# Patient Record
Sex: FEMALE | Race: WHITE | NOT HISPANIC OR LATINO | Employment: FULL TIME | URBAN - METROPOLITAN AREA
[De-identification: names, ages, dates, MRNs, and addresses within clinical notes are randomized per-mention and may not be internally consistent; named-entity substitution may affect disease eponyms.]

---

## 2024-01-02 DIAGNOSIS — L70.8 OTHER ACNE: Primary | ICD-10-CM

## 2024-01-02 DIAGNOSIS — L73.9 FOLLICULAR DISORDER, UNSPECIFIED: ICD-10-CM

## 2024-01-02 RX ORDER — DOXYCYCLINE 100 MG/1
100 CAPSULE ORAL DAILY
COMMUNITY
Start: 2023-05-19 | End: 2024-01-02 | Stop reason: SDUPTHER

## 2024-01-03 RX ORDER — DOXYCYCLINE 100 MG/1
100 CAPSULE ORAL DAILY
Qty: 90 CAPSULE | Refills: 0 | Status: SHIPPED | OUTPATIENT
Start: 2024-01-03 | End: 2024-04-15 | Stop reason: WASHOUT

## 2024-01-05 RX ORDER — ATORVASTATIN CALCIUM 20 MG/1
20 TABLET, FILM COATED ORAL NIGHTLY
COMMUNITY

## 2024-01-05 RX ORDER — ATORVASTATIN CALCIUM 20 MG/1
20 TABLET, FILM COATED ORAL NIGHTLY
Qty: 90 TABLET | Refills: 0 | OUTPATIENT
Start: 2024-01-05

## 2024-01-06 RX ORDER — DOXYCYCLINE 100 MG/1
CAPSULE ORAL
Qty: 180 CAPSULE | Refills: 11 | Status: SHIPPED | OUTPATIENT
Start: 2024-01-06

## 2024-03-13 ENCOUNTER — LAB (OUTPATIENT)
Dept: LAB | Facility: LAB | Age: 74
End: 2024-03-13
Payer: MEDICARE

## 2024-03-13 DIAGNOSIS — E03.9 HYPOTHYROIDISM, UNSPECIFIED: ICD-10-CM

## 2024-03-13 DIAGNOSIS — R73.02 IMPAIRED GLUCOSE TOLERANCE (ORAL): ICD-10-CM

## 2024-03-13 DIAGNOSIS — I10 ESSENTIAL (PRIMARY) HYPERTENSION: Primary | ICD-10-CM

## 2024-03-13 DIAGNOSIS — E78.5 HYPERLIPIDEMIA, UNSPECIFIED: ICD-10-CM

## 2024-03-13 DIAGNOSIS — E55.9 VITAMIN D DEFICIENCY, UNSPECIFIED: ICD-10-CM

## 2024-03-13 LAB
25(OH)D3 SERPL-MCNC: 63 NG/ML (ref 30–100)
ALBUMIN SERPL BCP-MCNC: 4.2 G/DL (ref 3.4–5)
ALP SERPL-CCNC: 50 U/L (ref 33–136)
ALT SERPL W P-5'-P-CCNC: 24 U/L (ref 7–45)
ANION GAP SERPL CALC-SCNC: 14 MMOL/L (ref 10–20)
APPEARANCE UR: ABNORMAL
AST SERPL W P-5'-P-CCNC: 31 U/L (ref 9–39)
BILIRUB SERPL-MCNC: 0.6 MG/DL (ref 0–1.2)
BILIRUB UR STRIP.AUTO-MCNC: NEGATIVE MG/DL
BUN SERPL-MCNC: 26 MG/DL (ref 6–23)
CALCIUM SERPL-MCNC: 9.6 MG/DL (ref 8.6–10.6)
CHLORIDE SERPL-SCNC: 103 MMOL/L (ref 98–107)
CO2 SERPL-SCNC: 27 MMOL/L (ref 21–32)
COLOR UR: YELLOW
CREAT SERPL-MCNC: 1.03 MG/DL (ref 0.5–1.05)
EGFRCR SERPLBLD CKD-EPI 2021: 58 ML/MIN/1.73M*2
ERYTHROCYTE [DISTWIDTH] IN BLOOD BY AUTOMATED COUNT: 14.5 % (ref 11.5–14.5)
EST. AVERAGE GLUCOSE BLD GHB EST-MCNC: 105 MG/DL
GLUCOSE SERPL-MCNC: 85 MG/DL (ref 74–99)
GLUCOSE UR STRIP.AUTO-MCNC: NORMAL MG/DL
HBA1C MFR BLD: 5.3 %
HCT VFR BLD AUTO: 39.5 % (ref 36–46)
HGB BLD-MCNC: 12.4 G/DL (ref 12–16)
KETONES UR STRIP.AUTO-MCNC: ABNORMAL MG/DL
LEUKOCYTE ESTERASE UR QL STRIP.AUTO: NEGATIVE
MCH RBC QN AUTO: 31.3 PG (ref 26–34)
MCHC RBC AUTO-ENTMCNC: 31.4 G/DL (ref 32–36)
MCV RBC AUTO: 100 FL (ref 80–100)
NITRITE UR QL STRIP.AUTO: NEGATIVE
NRBC BLD-RTO: 0 /100 WBCS (ref 0–0)
PH UR STRIP.AUTO: 5.5 [PH]
PLATELET # BLD AUTO: 267 X10*3/UL (ref 150–450)
POTASSIUM SERPL-SCNC: 4.4 MMOL/L (ref 3.5–5.3)
PROT SERPL-MCNC: 6.7 G/DL (ref 6.4–8.2)
PROT UR STRIP.AUTO-MCNC: ABNORMAL MG/DL
RBC # BLD AUTO: 3.96 X10*6/UL (ref 4–5.2)
RBC # UR STRIP.AUTO: NEGATIVE /UL
RBC #/AREA URNS AUTO: NORMAL /HPF
SODIUM SERPL-SCNC: 140 MMOL/L (ref 136–145)
SP GR UR STRIP.AUTO: 1.04
TSH SERPL-ACNC: 3.26 MIU/L (ref 0.44–3.98)
UROBILINOGEN UR STRIP.AUTO-MCNC: NORMAL MG/DL
WBC # BLD AUTO: 5 X10*3/UL (ref 4.4–11.3)
WBC #/AREA URNS AUTO: NORMAL /HPF

## 2024-03-13 PROCEDURE — 85027 COMPLETE CBC AUTOMATED: CPT

## 2024-03-13 PROCEDURE — 83036 HEMOGLOBIN GLYCOSYLATED A1C: CPT

## 2024-03-13 PROCEDURE — 80053 COMPREHEN METABOLIC PANEL: CPT

## 2024-03-13 PROCEDURE — 81001 URINALYSIS AUTO W/SCOPE: CPT

## 2024-03-13 PROCEDURE — 82306 VITAMIN D 25 HYDROXY: CPT

## 2024-03-13 PROCEDURE — 36415 COLL VENOUS BLD VENIPUNCTURE: CPT

## 2024-03-13 PROCEDURE — 84443 ASSAY THYROID STIM HORMONE: CPT

## 2024-03-15 ENCOUNTER — APPOINTMENT (OUTPATIENT)
Dept: DERMATOLOGY | Facility: CLINIC | Age: 74
End: 2024-03-15
Payer: MEDICARE

## 2024-03-18 ENCOUNTER — LAB (OUTPATIENT)
Dept: LAB | Facility: LAB | Age: 74
End: 2024-03-18
Payer: MEDICARE

## 2024-03-18 ENCOUNTER — HOSPITAL ENCOUNTER (OUTPATIENT)
Dept: RADIOLOGY | Facility: HOSPITAL | Age: 74
Discharge: HOME | End: 2024-03-18
Payer: MEDICARE

## 2024-03-18 VITALS — BODY MASS INDEX: 21.16 KG/M2 | HEIGHT: 65 IN | WEIGHT: 127 LBS

## 2024-03-18 DIAGNOSIS — R10.2 PELVIC AND PERINEAL PAIN: ICD-10-CM

## 2024-03-18 DIAGNOSIS — R10.11 RIGHT UPPER QUADRANT PAIN: ICD-10-CM

## 2024-03-18 DIAGNOSIS — E78.5 HYPERLIPIDEMIA, UNSPECIFIED: Primary | ICD-10-CM

## 2024-03-18 DIAGNOSIS — Z12.31 ENCOUNTER FOR SCREENING MAMMOGRAM FOR MALIGNANT NEOPLASM OF BREAST: ICD-10-CM

## 2024-03-18 LAB
CHOLEST SERPL-MCNC: 193 MG/DL (ref 0–199)
CHOLESTEROL/HDL RATIO: 2.6
HDLC SERPL-MCNC: 74.8 MG/DL
LDLC SERPL CALC-MCNC: 106 MG/DL
NON HDL CHOLESTEROL: 118 MG/DL (ref 0–149)
TRIGL SERPL-MCNC: 60 MG/DL (ref 0–149)
VLDL: 12 MG/DL (ref 0–40)

## 2024-03-18 PROCEDURE — 77067 SCR MAMMO BI INCL CAD: CPT

## 2024-03-18 PROCEDURE — 76830 TRANSVAGINAL US NON-OB: CPT

## 2024-03-18 PROCEDURE — 80061 LIPID PANEL: CPT

## 2024-03-18 PROCEDURE — 76705 ECHO EXAM OF ABDOMEN: CPT | Performed by: RADIOLOGY

## 2024-03-18 PROCEDURE — 76830 TRANSVAGINAL US NON-OB: CPT | Performed by: RADIOLOGY

## 2024-03-18 PROCEDURE — 76856 US EXAM PELVIC COMPLETE: CPT | Performed by: RADIOLOGY

## 2024-03-18 PROCEDURE — 77063 BREAST TOMOSYNTHESIS BI: CPT | Performed by: RADIOLOGY

## 2024-03-18 PROCEDURE — 36415 COLL VENOUS BLD VENIPUNCTURE: CPT

## 2024-03-18 PROCEDURE — 77067 SCR MAMMO BI INCL CAD: CPT | Performed by: RADIOLOGY

## 2024-03-18 PROCEDURE — 76705 ECHO EXAM OF ABDOMEN: CPT

## 2024-04-01 ENCOUNTER — TELEPHONE (OUTPATIENT)
Dept: PULMONOLOGY | Facility: CLINIC | Age: 74
End: 2024-04-01
Payer: MEDICARE

## 2024-04-01 NOTE — TELEPHONE ENCOUNTER
Lung Nodules were being monitored. Last saw 2 years ago. Patient has worsening cough. Do you want to order testing and then see patient, or see patient first?

## 2024-04-15 ENCOUNTER — OFFICE VISIT (OUTPATIENT)
Dept: PULMONOLOGY | Facility: CLINIC | Age: 74
End: 2024-04-15
Payer: MEDICARE

## 2024-04-15 VITALS
HEART RATE: 61 BPM | BODY MASS INDEX: 21.73 KG/M2 | DIASTOLIC BLOOD PRESSURE: 64 MMHG | SYSTOLIC BLOOD PRESSURE: 98 MMHG | WEIGHT: 130.6 LBS | RESPIRATION RATE: 16 BRPM | TEMPERATURE: 97.4 F | OXYGEN SATURATION: 98 %

## 2024-04-15 DIAGNOSIS — R05.3 CHRONIC COUGH: Primary | ICD-10-CM

## 2024-04-15 PROCEDURE — 1159F MED LIST DOCD IN RCRD: CPT | Performed by: INTERNAL MEDICINE

## 2024-04-15 PROCEDURE — 1036F TOBACCO NON-USER: CPT | Performed by: INTERNAL MEDICINE

## 2024-04-15 PROCEDURE — 1160F RVW MEDS BY RX/DR IN RCRD: CPT | Performed by: INTERNAL MEDICINE

## 2024-04-15 PROCEDURE — 99214 OFFICE O/P EST MOD 30 MIN: CPT | Performed by: INTERNAL MEDICINE

## 2024-04-15 ASSESSMENT — ENCOUNTER SYMPTOMS
UNEXPECTED WEIGHT CHANGE: 0
COUGH: 1
EYE DISCHARGE: 0
SINUS PRESSURE: 0
NERVOUS/ANXIOUS: 0
ARTHRALGIAS: 0
CONSTITUTIONAL NEGATIVE: 1
HEMATURIA: 0
SLEEP DISTURBANCE: 0
ABDOMINAL DISTENTION: 0
LIGHT-HEADEDNESS: 0
NUMBNESS: 0
ABDOMINAL PAIN: 0
NEUROLOGICAL NEGATIVE: 1
WHEEZING: 0
DYSURIA: 0
DIFFICULTY URINATING: 0
SHORTNESS OF BREATH: 0
FEVER: 0
JOINT SWELLING: 0
EYES NEGATIVE: 1
HEADACHES: 0
DIZZINESS: 0
FREQUENCY: 0
CARDIOVASCULAR NEGATIVE: 1
NAUSEA: 0
CHOKING: 0
FACIAL SWELLING: 0
MUSCULOSKELETAL NEGATIVE: 1
BRUISES/BLEEDS EASILY: 0
GASTROINTESTINAL NEGATIVE: 1
AGITATION: 0
WEAKNESS: 0
EYE REDNESS: 0
TREMORS: 0
SINUS PAIN: 0
ENDOCRINE NEGATIVE: 1
CONSTIPATION: 0
RHINORRHEA: 0
SPEECH DIFFICULTY: 0
HEMATOLOGIC/LYMPHATIC NEGATIVE: 1
PALPITATIONS: 0
ADENOPATHY: 0
APNEA: 0
PSYCHIATRIC NEGATIVE: 1
ALLERGIC/IMMUNOLOGIC NEGATIVE: 1
FATIGUE: 0
STRIDOR: 0

## 2024-04-15 NOTE — PROGRESS NOTES
@PULMONARY FOLLOW-UP@       PROBLEM:  cough     ASSESSMENT:  The patient is a 73-year-old with osteopenia, Raynaud's and a chronic cough for years.  Her methacholine challenge test was negative.  She reports a history of asthma growing up with some seasonal allergies.  She has no occupational exposures working as an .  She is a never smoker.  She did have some air trapping on the previous CT scan several years ago with some apical nodular scarring.  For completeness probably a follow-up HRCT scan of the chest is in order.    PLAN:  Would suggest obtaining a follow-up high-resolution CT scan of the chest by calling       HISTORY OF PRESENT ILLNESS:  The patient is a 73 -year-old   with a history of anxiety depression, osteopenia and Raynaud's phenomena. She also has a history of asthma and is on Advair and Singulair. In addition, she had a history of ovarian cancer, and glucose intolerance and some seasonal allergies. She smoked an occasional amount in the the past and several months before was treated with some prednisone. In addition, she's been followed for some pulmonary nodules which have been stable and she received proton pump inhibitor for GERD. The last CT scan was performed in May 2018 revealed multiple bilateral pulmonary nodules measuring up to 7 mm.     When seen on October 15, 2018 the patient reported that her cough has been present over the last year and a half or so. Perhaps it has worsened somewhat in the spring but there are no specific triggers. She did have asthma as a child and some hayfever which she outgrew. Also she had some cold weather induced asthma growing out. She had no postnasal drip or sinus congestion. Occasionally she had some eczema. Also time she felt that she may aspirate liquids. She [place] up [place] farm and was around various animals. Her father developed asthma as an older adult. She smoked a little bit in college and works as an      It should be  noted that she has been treated with some oral corticosteroids, antibiotics and Tessalon with limited improvement. In addition, she continued on her Advair and Singulair as noted. She found that rescue inhalers were not helpful.    Her PFTs were obtained and were normal and I considered ordering a methacholine challenge test and she needed follow-up CT scanning as well as evaluation for aspiration    When seen again in follow-up on 2020 is reported that she has had several CT scans the most recent from 2020. The latter demonstrated the followin. Hyperinflation with multifocal areas of nodularity and likely scarring. An area of pleural-based nodularity in the posterior aspect of the right upper lobe may represent more pronounced scarring or atelectasis and appears larger than the prior exam. Consider another follow-up CT of the chest in approximately 6 months to re-evaluate and assess stability.    Also her methacholine challenge test was negative and she did demonstrate some laryngeal penetration on modified barium swallow and was sent to ENT.     Also, when seen last it also was noted that voice and swallowing training any aspiration has helped. She is going through a difficult time having lost both parents in the last year. Currently, she is having no coughing congestion or wheezing. She generally feels decent. She was placed on Zoloft after her parents . She continues on it at the present time.    A follow-up CT scan from 2022 revealed the following  1.  Apical pleural thickening with nodularity is stable when compared  to a study of 2018. There is no evidence of interstitial lung  disease. There is mild basilar air trapping without peribronchial  thickening or bronchiectasis.      The patient's cough is getting worse.  This is particular over the last several months.  She may have had a cold when it started getting worse.  She has had a chronic cough that I saw  her several years ago for.  Evaluation as outlined above.  She has been using the swallowing techniques that may be the cause for her chronic coughing.  She has no fevers or chills.  She has no major postnasal drip.      Not on File         Current Outpatient Medications:     atorvastatin (Lipitor) 20 mg tablet, Take 1 tablet (20 mg) by mouth once daily at bedtime., Disp: , Rfl:     doxycycline (Vibramycin) 100 mg capsule, Take 1 capsule by mouth twice daily with food and a full glass of water., Disp: 180 capsule, Rfl: 11    pantoprazole (ProtoNix) 40 mg EC tablet, TAKE 1 TABLET BY MOUTH EVERY DAY IN THE MORNING, Disp: 90 tablet, Rfl: 3          Review of Systems   Constitutional: Negative.  Negative for fatigue, fever and unexpected weight change.   HENT: Negative.  Negative for congestion, facial swelling, nosebleeds, postnasal drip, rhinorrhea, sinus pressure and sinus pain.    Eyes: Negative.  Negative for discharge, redness and visual disturbance.   Respiratory:  Positive for cough. Negative for apnea, choking, shortness of breath, wheezing and stridor.    Cardiovascular: Negative.  Negative for chest pain, palpitations and leg swelling.   Gastrointestinal: Negative.  Negative for abdominal distention, abdominal pain, constipation and nausea.   Endocrine: Negative.  Negative for cold intolerance and heat intolerance.   Genitourinary: Negative.  Negative for difficulty urinating, dysuria, frequency and hematuria.   Musculoskeletal: Negative.  Negative for arthralgias, gait problem and joint swelling.   Skin: Negative.    Allergic/Immunologic: Negative.  Negative for environmental allergies, food allergies and immunocompromised state.   Neurological: Negative.  Negative for dizziness, tremors, syncope, speech difficulty, weakness, light-headedness, numbness and headaches.   Hematological: Negative.  Negative for adenopathy. Does not bruise/bleed easily.   Psychiatric/Behavioral: Negative.  Negative for agitation,  behavioral problems and sleep disturbance. The patient is not nervous/anxious.    All other systems reviewed and are negative.       Vitals:    04/15/24 1043   BP: 98/64   Pulse: 61   Resp: 16   Temp: 36.3 °C (97.4 °F)   SpO2: 98%        Physical Exam  Vitals reviewed.   Constitutional:       Appearance: Normal appearance.   HENT:      Head: Normocephalic and atraumatic.   Eyes:      Extraocular Movements: Extraocular movements intact.   Cardiovascular:      Rate and Rhythm: Normal rate and regular rhythm.      Heart sounds: No murmur heard.     No friction rub. No gallop.   Pulmonary:      Effort: Pulmonary effort is normal. No respiratory distress.      Breath sounds: Normal breath sounds. No stridor. No wheezing, rhonchi or rales.   Chest:      Chest wall: No tenderness.   Abdominal:      General: Abdomen is flat. There is no distension.      Palpations: Abdomen is soft. There is no mass.      Tenderness: There is no abdominal tenderness.   Musculoskeletal:         General: Normal range of motion.      Cervical back: Normal range of motion.      Right lower leg: No edema.      Left lower leg: No edema.   Skin:     General: Skin is warm and dry.   Neurological:      Mental Status: She is alert and oriented to person, place, and time.   Psychiatric:         Mood and Affect: Mood normal.         Behavior: Behavior normal.

## 2024-04-18 ENCOUNTER — HOSPITAL ENCOUNTER (OUTPATIENT)
Dept: RADIOLOGY | Facility: CLINIC | Age: 74
Discharge: HOME | End: 2024-04-18
Payer: MEDICARE

## 2024-04-18 ENCOUNTER — DOCUMENTATION (OUTPATIENT)
Dept: PULMONOLOGY | Facility: HOSPITAL | Age: 74
End: 2024-04-18
Payer: MEDICARE

## 2024-04-18 DIAGNOSIS — R05.3 CHRONIC COUGH: ICD-10-CM

## 2024-04-18 DIAGNOSIS — R05.3 CHRONIC COUGH: Primary | ICD-10-CM

## 2024-04-18 PROCEDURE — 71250 CT THORAX DX C-: CPT | Performed by: RADIOLOGY

## 2024-04-18 PROCEDURE — 71250 CT THORAX DX C-: CPT

## 2024-04-18 RX ORDER — PREDNISONE 10 MG/1
TABLET ORAL
Qty: 30 TABLET | Refills: 0 | Status: SHIPPED | OUTPATIENT
Start: 2024-04-18 | End: 2024-05-18

## 2024-04-18 NOTE — PROGRESS NOTES
Patient CT scan reveals no fibrosis or anything of concern.  She has some slight nodularity which is new probably mucous plugging.  Even though her methacholine challenge test was negative in the past I suspect it still is airway inflammation causing the coughing.  Will try a prednisone taper and she will keep me posted.  Will get a follow-up CT scan in 6 months.

## 2024-04-22 ENCOUNTER — APPOINTMENT (OUTPATIENT)
Dept: RADIOLOGY | Facility: CLINIC | Age: 74
End: 2024-04-22
Payer: MEDICARE

## 2024-05-23 ENCOUNTER — HOSPITAL ENCOUNTER (OUTPATIENT)
Dept: RADIOLOGY | Facility: CLINIC | Age: 74
Discharge: HOME | End: 2024-05-23
Payer: MEDICARE

## 2024-05-23 DIAGNOSIS — Z78.0 ASYMPTOMATIC MENOPAUSAL STATE: ICD-10-CM

## 2024-06-12 ENCOUNTER — DOCUMENTATION (OUTPATIENT)
Dept: PULMONOLOGY | Facility: HOSPITAL | Age: 74
End: 2024-06-12
Payer: MEDICARE

## 2024-06-12 ENCOUNTER — TELEPHONE (OUTPATIENT)
Dept: PULMONOLOGY | Facility: CLINIC | Age: 74
End: 2024-06-12
Payer: MEDICARE

## 2024-06-12 DIAGNOSIS — R05.3 CHRONIC COUGH: Primary | ICD-10-CM

## 2024-06-12 RX ORDER — ALBUTEROL SULFATE 90 UG/1
2 AEROSOL, METERED RESPIRATORY (INHALATION) EVERY 6 HOURS PRN
Qty: 18 G | Refills: 11 | Status: SHIPPED | OUTPATIENT
Start: 2024-06-12 | End: 2025-06-12

## 2024-06-12 RX ORDER — MOMETASONE FUROATE AND FORMOTEROL FUMARATE DIHYDRATE 100; 5 UG/1; UG/1
2 AEROSOL RESPIRATORY (INHALATION)
Qty: 13 G | Refills: 11 | Status: SHIPPED | OUTPATIENT
Start: 2024-06-12 | End: 2025-06-12

## 2024-06-12 NOTE — PROGRESS NOTES
Patient continues to cough despite the negative methacholine challenge test in the past.  However there there is suggestion this is inflammatory in the airways.  Will try some controller medication along with albuterol as needed.  She will keep me posted.

## 2024-06-18 ENCOUNTER — LAB (OUTPATIENT)
Dept: LAB | Facility: LAB | Age: 74
End: 2024-06-18
Payer: MEDICARE

## 2024-06-18 DIAGNOSIS — L72.8 OTHER FOLLICULAR CYSTS OF THE SKIN AND SUBCUTANEOUS TISSUE: ICD-10-CM

## 2024-06-18 DIAGNOSIS — L70.0 ACNE VULGARIS: Primary | ICD-10-CM

## 2024-07-29 ENCOUNTER — APPOINTMENT (OUTPATIENT)
Dept: ORTHOPEDIC SURGERY | Facility: CLINIC | Age: 74
End: 2024-07-29
Payer: MEDICARE

## 2024-07-29 ENCOUNTER — HOSPITAL ENCOUNTER (OUTPATIENT)
Dept: RADIOLOGY | Facility: CLINIC | Age: 74
Discharge: HOME | End: 2024-07-29
Payer: MEDICARE

## 2024-07-29 DIAGNOSIS — M19.031 OSTEOARTHRITIS OF RIGHT WRIST, UNSPECIFIED OSTEOARTHRITIS TYPE: Primary | ICD-10-CM

## 2024-07-29 DIAGNOSIS — M79.641 RIGHT HAND PAIN: ICD-10-CM

## 2024-07-29 PROCEDURE — 20606 DRAIN/INJ JOINT/BURSA W/US: CPT | Performed by: ORTHOPAEDIC SURGERY

## 2024-07-29 PROCEDURE — 99203 OFFICE O/P NEW LOW 30 MIN: CPT | Performed by: ORTHOPAEDIC SURGERY

## 2024-07-29 PROCEDURE — 73130 X-RAY EXAM OF HAND: CPT | Mod: RT

## 2024-07-29 PROCEDURE — 1125F AMNT PAIN NOTED PAIN PRSNT: CPT | Performed by: ORTHOPAEDIC SURGERY

## 2024-07-29 PROCEDURE — 1036F TOBACCO NON-USER: CPT | Performed by: ORTHOPAEDIC SURGERY

## 2024-07-29 PROCEDURE — 1159F MED LIST DOCD IN RCRD: CPT | Performed by: ORTHOPAEDIC SURGERY

## 2024-07-29 PROCEDURE — 1160F RVW MEDS BY RX/DR IN RCRD: CPT | Performed by: ORTHOPAEDIC SURGERY

## 2024-07-29 PROCEDURE — L3924 HFO WITHOUT JOINTS PRE OTS: HCPCS | Performed by: ORTHOPAEDIC SURGERY

## 2024-07-29 PROCEDURE — 73130 X-RAY EXAM OF HAND: CPT | Mod: RIGHT SIDE | Performed by: RADIOLOGY

## 2024-07-29 RX ORDER — LIDOCAINE HYDROCHLORIDE 10 MG/ML
1 INJECTION INFILTRATION; PERINEURAL
Status: COMPLETED | OUTPATIENT
Start: 2024-07-29 | End: 2024-07-29

## 2024-07-29 RX ORDER — METHYLPREDNISOLONE ACETATE 40 MG/ML
20 INJECTION, SUSPENSION INTRA-ARTICULAR; INTRALESIONAL; INTRAMUSCULAR; SOFT TISSUE
Status: COMPLETED | OUTPATIENT
Start: 2024-07-29 | End: 2024-07-29

## 2024-07-29 ASSESSMENT — ENCOUNTER SYMPTOMS
TROUBLE SWALLOWING: 0
FEVER: 0
ARTHRALGIAS: 1
CHILLS: 0
WHEEZING: 0
SHORTNESS OF BREATH: 0
EYE DISCHARGE: 0
JOINT SWELLING: 1

## 2024-07-29 ASSESSMENT — PAIN SCALES - GENERAL: PAINLEVEL_OUTOF10: 4

## 2024-07-29 ASSESSMENT — PAIN - FUNCTIONAL ASSESSMENT: PAIN_FUNCTIONAL_ASSESSMENT: 0-10

## 2024-07-29 NOTE — PROGRESS NOTES
Reason for Appointment  Chief Complaint   Patient presents with    Right Thumb - Pain     History of Present Illness  New patient is a 73 y.o. female here today for evaluation of her right thumb.  She has a history of previous right carpal tunnel release, having significant pain and swelling at the base of the right thumb, worse with pinching and gripping.  X-rays taken today are reviewed and do show moderate CMC DJD. No recurrent numbness in the hand.    Past Medical History:   Diagnosis Date    Other specific arthropathies, not elsewhere classified, unspecified shoulder     Rotator cuff arthropathy    Personal history of other diseases of the circulatory system     History of Raynaud's syndrome    Personal history of other diseases of the musculoskeletal system and connective tissue 11/10/2014    History of rotator cuff tear    Personal history of other diseases of the musculoskeletal system and connective tissue     History of arthritis    Personal history of other diseases of the respiratory system     History of allergic rhinitis       Past Surgical History:   Procedure Laterality Date    OTHER SURGICAL HISTORY  01/28/2014    Salpingo-oophorectomy Unilateral       Medication Documentation Review Audit       Reviewed by Sissy Philip PA-C (Physician Assistant) on 07/29/24 at 1526      Medication Order Taking? Sig Documenting Provider Last Dose Status   albuterol 90 mcg/actuation inhaler [id number] Yes Inhale 2 puffs every 6 hours if needed for wheezing. Urbano Butcher MD MPH Taking Active   atorvastatin (Lipitor) 20 mg tablet [id number] Yes Take 1 tablet (20 mg) by mouth once daily at bedtime. Historical Provider, MD Taking Active   doxycycline (Vibramycin) 100 mg capsule [id number]  Take 1 capsule by mouth twice daily with food and a full glass of water. Ahmet Ruby MD PhD  Active   mometasone-formoterol (Dulera) 100-5 mcg/actuation inhaler [id number] Yes Inhale 2 puffs 2 times a day. Rinse mouth with  water after use to reduce aftertaste and incidence of candidiasis. Do not swallow. Urbano Butcher MD MPH Taking Active   pantoprazole (ProtoNix) 40 mg EC tablet [id number] Yes TAKE 1 TABLET BY MOUTH EVERY DAY IN THE MORNING Isauro Michelle MD Taking Active                    No Known Allergies    Review of Systems   Constitutional:  Negative for chills and fever.   HENT:  Negative for mouth sores, sneezing and trouble swallowing.    Eyes:  Negative for discharge.   Respiratory:  Negative for shortness of breath and wheezing.    Cardiovascular:  Negative for chest pain.   Musculoskeletal:  Positive for arthralgias and joint swelling.   Skin:  Negative for pallor and rash.   All other systems reviewed and are negative.    Exam   On exam patient is alert, awake, and in no acute distress.  Head is normocephalic, no JVD, no auditory wheezes.  She has decent shoulder and elbow motion, swelling at the base of the right thumb.  Tender over the right thumb CMC joint and decent digital motion otherwise.  No triggering of the digits.  Good pulses and sensation in the upper extremity.  Skin is warm and dry, without ulcerations, no other swelling or lymphadenopathy    Assessment   Encounter Diagnosis   Name Primary?    Right hand pain Yes   Right wrist osteoarthritis    Plan   She is having classic thumb CMC arthritis symptoms.  We discussed conservative treatment today with an injection and bracing.  We sterilely injected under ultrasound guidance Depo-Medrol and lidocaine into the right thumb CMC joint and a Comfort Cool brace to wear to support the thumb with activity.  Patient understands the small risk of infection and the signs to look for as well as flare action.  Hopefully this gives her good relief.  She can follow-up with us as needed.    M Inj/Asp: R radiocarpal (R CMC) on 7/29/2024 3:45 PM  Indications: pain  Details: ultrasound-guided  Medications: 1 mL lidocaine 10 mg/mL (1 %); 20 mg methylPREDNISolone  acetate 40 mg/mL  Outcome: tolerated well, no immediate complications    After discussing the risks and benefits of the procedure we proceeded with the injection. Using ultrasound guidance we obtained images of the thumb CMC joint and subsequently we sterilely injected a mixture of 20 mg of DepoMedrol and .5 cc of 1% lidocaine into the right CMC joint. Pt tolerated the procedure well without any adverse effects.   Procedure, treatment alternatives, risks and benefits explained, specific risks discussed. Consent was given by the patient. Immediately prior to procedure a time out was called to verify the correct patient, procedure, equipment, support staff and site/side marked as required. Patient was prepped and draped in the usual sterile fashion.       Written by Sissy Ruiz saw, evaluated, and treated the patient with the PA

## 2024-08-02 ENCOUNTER — APPOINTMENT (OUTPATIENT)
Dept: RADIOLOGY | Facility: CLINIC | Age: 74
End: 2024-08-02
Payer: MEDICARE

## 2024-09-10 ENCOUNTER — HOSPITAL ENCOUNTER (OUTPATIENT)
Dept: RADIOLOGY | Facility: CLINIC | Age: 74
Discharge: HOME | End: 2024-09-10
Payer: MEDICARE

## 2024-09-10 PROCEDURE — 77080 DXA BONE DENSITY AXIAL: CPT

## 2024-09-20 ENCOUNTER — HOSPITAL ENCOUNTER (OUTPATIENT)
Dept: RADIOLOGY | Facility: HOSPITAL | Age: 74
Discharge: HOME | End: 2024-09-20
Payer: MEDICARE

## 2024-09-20 DIAGNOSIS — Z13.6 ENCOUNTER FOR SCREENING FOR CARDIOVASCULAR DISORDERS: ICD-10-CM

## 2024-09-20 PROCEDURE — 75571 CT HRT W/O DYE W/CA TEST: CPT

## 2024-11-06 ENCOUNTER — TELEPHONE (OUTPATIENT)
Dept: PULMONOLOGY | Facility: CLINIC | Age: 74
End: 2024-11-06
Payer: MEDICARE

## 2024-11-06 DIAGNOSIS — R05.3 CHRONIC COUGH: Primary | ICD-10-CM

## 2024-11-06 RX ORDER — BENZONATATE 100 MG/1
100 CAPSULE ORAL 3 TIMES DAILY PRN
Qty: 20 CAPSULE | Refills: 1 | Status: SHIPPED | OUTPATIENT
Start: 2024-11-06 | End: 2024-12-06

## 2024-11-06 NOTE — TELEPHONE ENCOUNTER
Patient taylor she is having a lot more coughing and would like something called into her pharm on file.    Patient taylor in the past tessalon perles has worked for her. She also has an appt set up for 11/20/24 @8:45am

## 2024-11-20 ENCOUNTER — OFFICE VISIT (OUTPATIENT)
Dept: PULMONOLOGY | Facility: CLINIC | Age: 74
End: 2024-11-20
Payer: MEDICARE

## 2024-11-20 VITALS
RESPIRATION RATE: 16 BRPM | DIASTOLIC BLOOD PRESSURE: 66 MMHG | BODY MASS INDEX: 22.53 KG/M2 | SYSTOLIC BLOOD PRESSURE: 104 MMHG | TEMPERATURE: 97.2 F | WEIGHT: 135.4 LBS | OXYGEN SATURATION: 100 %

## 2024-11-20 DIAGNOSIS — R05.3 CHRONIC COUGH: ICD-10-CM

## 2024-11-20 PROCEDURE — 99214 OFFICE O/P EST MOD 30 MIN: CPT | Performed by: INTERNAL MEDICINE

## 2024-11-20 PROCEDURE — 1160F RVW MEDS BY RX/DR IN RCRD: CPT | Performed by: INTERNAL MEDICINE

## 2024-11-20 PROCEDURE — 1036F TOBACCO NON-USER: CPT | Performed by: INTERNAL MEDICINE

## 2024-11-20 PROCEDURE — 1159F MED LIST DOCD IN RCRD: CPT | Performed by: INTERNAL MEDICINE

## 2024-11-20 RX ORDER — BENZONATATE 100 MG/1
100 CAPSULE ORAL 3 TIMES DAILY PRN
Qty: 100 CAPSULE | Refills: 3 | Status: SHIPPED | OUTPATIENT
Start: 2024-11-20 | End: 2025-11-20

## 2024-11-20 ASSESSMENT — ENCOUNTER SYMPTOMS
NERVOUS/ANXIOUS: 0
STRIDOR: 0
COUGH: 1
CHOKING: 0
NUMBNESS: 0
RHINORRHEA: 0
AGITATION: 0
HEADACHES: 0
DIZZINESS: 0
WHEEZING: 0
SINUS PRESSURE: 0
BRUISES/BLEEDS EASILY: 0
FREQUENCY: 0
DYSURIA: 0
JOINT SWELLING: 0
FACIAL SWELLING: 0
PALPITATIONS: 0
DIFFICULTY URINATING: 0
HEMATURIA: 0
EYE DISCHARGE: 0
NAUSEA: 0
FEVER: 0
LIGHT-HEADEDNESS: 0
EYE REDNESS: 0
SLEEP DISTURBANCE: 0
WEAKNESS: 0
FATIGUE: 0
TREMORS: 0
ADENOPATHY: 0
ARTHRALGIAS: 0
SHORTNESS OF BREATH: 0
CONSTIPATION: 0
ABDOMINAL PAIN: 0
APNEA: 0
ABDOMINAL DISTENTION: 0
SPEECH DIFFICULTY: 0
SINUS PAIN: 0
UNEXPECTED WEIGHT CHANGE: 0

## 2024-11-20 NOTE — PATIENT INSTRUCTIONS
I am going to order a follow-up HRCT scan of the chest because of nodularity noted in the spring.    Also I renewed the Tessalon pearls which can be taken 1 or 2 up to 3 times a day as needed.

## 2024-11-20 NOTE — PROGRESS NOTES
@PULMONARY FOLLOW-UP@       PROBLEM: Coughing    ASSESSMENT:  The patient is a 74-year-old with a history of osteopenia Raynaud's and a chronic cough for years.  She has had a negative methacholine challenge test.  There is slight laryngeal penetration on modified barium swallow and she was sent to ENT.  She has been treated for GERD and while she reports a history of asthma growing up with seasonal allergies her methacholine challenge test was negative as outlined above.  The last CT did demonstrate some increased mucus production.  She does find that the Tessalon Perles help her cough by around 80%.  Her lungs currently are clear on auscultation.    PLAN:  A follow-up CT scan of the chest has been ordered along with a prescription for Tessalon Perles which seem to help.      HISTORY OF PRESENT ILLNESS:  The patient is a 74 -year-old   with a history of anxiety depression, osteopenia and Raynaud's phenomena. She also has a history of asthma and is on Advair and Singulair. In addition, she had a history of ovarian cancer, and glucose intolerance and some seasonal allergies. She smoked an occasional amount in the the past and several months before was treated with some prednisone. In addition, she's been followed for some pulmonary nodules which have been stable and she received proton pump inhibitor for GERD. The last CT scan was performed in May 2018 revealed multiple bilateral pulmonary nodules measuring up to 7 mm.     When seen on October 15, 2018 the patient reported that her cough has been present over the last year and a half or so. Perhaps it has worsened somewhat in the spring but there are no specific triggers. She did have asthma as a child and some hayfever which she outgrew. Also she had some cold weather induced asthma growing out. She had no postnasal drip or sinus congestion. Occasionally she had some eczema. Also time she felt that she may aspirate liquids. She grew up [place] a farm and was around  various animals. Her father developed asthma as an older adult. She smoked a little bit in college and works as an      It should be noted that she has been treated with some oral corticosteroids, antibiotics and Tessalon with limited improvement. In addition, she continued on her Advair and Singulair as noted. She found that rescue inhalers were not helpful.     Her PFTs were obtained and were normal and I considered ordering a methacholine challenge test and she needed follow-up CT scanning as well as evaluation for aspiration     When seen again in follow-up on 2020 is reported that she has had several CT scans the most recent from 2020. The latter demonstrated the followin. Hyperinflation with multifocal areas of nodularity and likely scarring. An area of pleural-based nodularity in the posterior aspect of the right upper lobe may represent more pronounced scarring or atelectasis and appears larger than the prior exam. Consider another follow-up CT of the chest in approximately 6 months to re-evaluate and assess stability.     Also her methacholine challenge test was negative and she did demonstrate some laryngeal penetration on modified barium swallow and was sent to ENT.     Also, when seen last it also was noted that voice and swallowing training any aspiration has helped. She is going through a difficult time having lost both parents in the last year. Currently, she is having no coughing congestion or wheezing. She generally feels decent. She was placed on Zoloft after her parents . She continues on it at the present time.     A follow-up CT scan from 2022 revealed the following  1.  Apical pleural thickening with nodularity is stable when compared  to a study of 2018. There is no evidence of interstitial lung  disease. There is mild basilar air trapping without peribronchial  thickening or bronchiectasis.        On April 15, 2024 was noted the  patient's cough is getting worse.  This is particular over the last several months.  She may have had a cold when it started getting worse.  She has had a chronic cough that I saw her several years ago for.  Evaluation as outlined above.  She has been using the swallowing techniques that may be the cause for her chronic coughing.  She has no fevers or chills.  She has no major postnasal drip.       On April 18, 2024 the following was recorded at the CT scan reveals no fibrosis or anything of concern. She has some slight nodularity which is new probably mucous plugging. Even though her methacholine challenge test was negative in the past I suspect it still is airway inflammation causing the coughing. Will try a prednisone taper and she will keep me posted. Will get a follow-up CT scan in 6 months.     Currently, the patient reports that she continues to cough that has gotten worse over the last several months.  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  After the race she was coughing more and had some blood-tinged sputum.  She has no fevers or chills.  The cough is not associated with any  particular triggers.  She had the CT scan as outlined above and has had a negative methacholine challenge test.  She did have some laryngeal penetration on a modified barium swallow and was sent to ENT.  She has no particular triggers for the coughing but she does find that the administration of Tessalon Perles clearly helps about 80%.      No Known Allergies         Current Outpatient Medications:     albuterol 90 mcg/actuation inhaler, Inhale 2 puffs every 6 hours if needed for wheezing., Disp: 18 g, Rfl: 11    atorvastatin (Lipitor) 20 mg tablet, Take 1 tablet (20 mg) by mouth once daily at bedtime., Disp: , Rfl:     mometasone-formoterol (Dulera) 100-5 mcg/actuation inhaler, Inhale 2 puffs 2 times a day. Rinse mouth with water after use to reduce aftertaste and incidence of  candidiasis. Do not swallow., Disp: 13 g, Rfl: 11    pantoprazole (ProtoNix) 40 mg EC tablet, TAKE 1 TABLET BY MOUTH EVERY DAY IN THE MORNING, Disp: 90 tablet, Rfl: 3    benzonatate (Tessalon) 100 mg capsule, Take 1 capsule (100 mg) by mouth 3 times a day as needed for cough. Do not crush or chew., Disp: 100 capsule, Rfl: 3          Review of Systems   Constitutional:  Negative for fatigue, fever and unexpected weight change.   HENT:  Negative for congestion, facial swelling, nosebleeds, postnasal drip, rhinorrhea, sinus pressure and sinus pain.    Eyes:  Negative for discharge, redness and visual disturbance.   Respiratory:  Positive for cough. Negative for apnea, choking, shortness of breath, wheezing and stridor.    Cardiovascular:  Negative for chest pain, palpitations and leg swelling.   Gastrointestinal:  Negative for abdominal distention, abdominal pain, constipation and nausea.   Endocrine: Negative for cold intolerance and heat intolerance.   Genitourinary:  Negative for difficulty urinating, dysuria, frequency and hematuria.   Musculoskeletal:  Negative for arthralgias, gait problem and joint swelling.   Allergic/Immunologic: Negative for environmental allergies, food allergies and immunocompromised state.   Neurological:  Negative for dizziness, tremors, syncope, speech difficulty, weakness, light-headedness, numbness and headaches.   Hematological:  Negative for adenopathy. Does not bruise/bleed easily.   Psychiatric/Behavioral:  Negative for agitation, behavioral problems and sleep disturbance. The patient is not nervous/anxious.         Vitals:    11/20/24 0847   BP: 104/66   Resp: 16   Temp: 36.2 °C (97.2 °F)   SpO2: 100%        Physical Exam  Vitals reviewed.   Constitutional:       Appearance: Normal appearance.   HENT:      Head: Normocephalic and atraumatic.   Eyes:      Extraocular Movements: Extraocular movements intact.   Cardiovascular:      Rate and Rhythm: Normal rate and regular rhythm.       Heart sounds: No murmur heard.     No friction rub. No gallop.   Pulmonary:      Effort: Pulmonary effort is normal. No respiratory distress.      Breath sounds: Normal breath sounds. No stridor. No wheezing, rhonchi or rales.   Chest:      Chest wall: No tenderness.   Abdominal:      General: Abdomen is flat. There is no distension.      Palpations: Abdomen is soft. There is no mass.      Tenderness: There is no abdominal tenderness.   Musculoskeletal:         General: Normal range of motion.      Cervical back: Normal range of motion.      Right lower leg: No edema.      Left lower leg: No edema.   Skin:     General: Skin is warm and dry.   Neurological:      Mental Status: She is alert and oriented to person, place, and time.   Psychiatric:         Mood and Affect: Mood normal.         Behavior: Behavior normal.

## 2024-11-21 ENCOUNTER — HOSPITAL ENCOUNTER (OUTPATIENT)
Dept: RADIOLOGY | Facility: CLINIC | Age: 74
Discharge: HOME | End: 2024-11-21
Payer: MEDICARE

## 2024-11-21 DIAGNOSIS — R05.3 CHRONIC COUGH: ICD-10-CM

## 2024-11-21 PROCEDURE — 71250 CT THORAX DX C-: CPT | Performed by: RADIOLOGY

## 2024-11-21 PROCEDURE — 71250 CT THORAX DX C-: CPT

## 2024-11-22 ENCOUNTER — DOCUMENTATION (OUTPATIENT)
Dept: PULMONOLOGY | Facility: HOSPITAL | Age: 74
End: 2024-11-22
Payer: MEDICARE

## 2024-11-22 DIAGNOSIS — J45.991 COUGH VARIANT ASTHMA (HHS-HCC): ICD-10-CM

## 2024-11-22 DIAGNOSIS — R05.3 CHRONIC COUGH: Primary | ICD-10-CM

## 2024-11-22 NOTE — PROGRESS NOTES
The patient's CT scan reveals definite mucus in some of the airways with stable nodularity.  Interestingly, a CBC from December 6, 2017 revealed 9% eosinophilia and from Cherelle 10, 2019 outlined 6.7% eosinophilia.  More recent CBCs have outlined normal percent eosinophilia values.  It sounds like she may be allergic to something causing mucus production.  I am going to order a respiratory allergy panel along with CBC with differential.  Also will order nebulized budesonide twice a day to see if that helps.  She has been on i ICS over the years with inhalers and it really has not helped.  She has had a negative methacholine challenge test.  The coughing is clearly significantly increased of late.

## 2024-11-25 DIAGNOSIS — R05.3 CHRONIC COUGH: Primary | ICD-10-CM

## 2024-11-25 RX ORDER — BUDESONIDE 0.5 MG/2ML
0.5 INHALANT ORAL 2 TIMES DAILY
Qty: 120 ML | Refills: 11 | Status: SHIPPED | OUTPATIENT
Start: 2024-11-25 | End: 2025-11-25

## 2024-11-25 NOTE — PROGRESS NOTES
as outlined in my previous note I am ordering budesonide nebulization for her to see if that helps her chronic coughing and congestion and mucus production

## 2024-11-27 ENCOUNTER — LAB (OUTPATIENT)
Dept: LAB | Facility: LAB | Age: 74
End: 2024-11-27
Payer: MEDICARE

## 2024-11-27 DIAGNOSIS — R05.3 CHRONIC COUGH: ICD-10-CM

## 2024-11-27 DIAGNOSIS — J45.991 COUGH VARIANT ASTHMA (HHS-HCC): ICD-10-CM

## 2024-11-27 LAB
BASOPHILS # BLD AUTO: 0.04 X10*3/UL (ref 0–0.1)
BASOPHILS NFR BLD AUTO: 1.2 %
EOSINOPHIL # BLD AUTO: 0.11 X10*3/UL (ref 0–0.4)
EOSINOPHIL NFR BLD AUTO: 3.2 %
ERYTHROCYTE [DISTWIDTH] IN BLOOD BY AUTOMATED COUNT: 12.5 % (ref 11.5–14.5)
HCT VFR BLD AUTO: 38 % (ref 36–46)
HGB BLD-MCNC: 12.1 G/DL (ref 12–16)
IMM GRANULOCYTES # BLD AUTO: 0.01 X10*3/UL (ref 0–0.5)
IMM GRANULOCYTES NFR BLD AUTO: 0.3 % (ref 0–0.9)
LYMPHOCYTES # BLD AUTO: 1.47 X10*3/UL (ref 0.8–3)
LYMPHOCYTES NFR BLD AUTO: 42.5 %
MCH RBC QN AUTO: 30.1 PG (ref 26–34)
MCHC RBC AUTO-ENTMCNC: 31.8 G/DL (ref 32–36)
MCV RBC AUTO: 95 FL (ref 80–100)
MONOCYTES # BLD AUTO: 0.48 X10*3/UL (ref 0.05–0.8)
MONOCYTES NFR BLD AUTO: 13.9 %
NEUTROPHILS # BLD AUTO: 1.35 X10*3/UL (ref 1.6–5.5)
NEUTROPHILS NFR BLD AUTO: 38.9 %
NRBC BLD-RTO: 0 /100 WBCS (ref 0–0)
PLATELET # BLD AUTO: 202 X10*3/UL (ref 150–450)
RBC # BLD AUTO: 4.02 X10*6/UL (ref 4–5.2)
WBC # BLD AUTO: 3.5 X10*3/UL (ref 4.4–11.3)

## 2024-11-27 PROCEDURE — 36415 COLL VENOUS BLD VENIPUNCTURE: CPT

## 2024-11-27 PROCEDURE — 85025 COMPLETE CBC W/AUTO DIFF WBC: CPT

## 2024-11-27 PROCEDURE — 82785 ASSAY OF IGE: CPT

## 2024-11-27 PROCEDURE — 86003 ALLG SPEC IGE CRUDE XTRC EA: CPT

## 2024-11-30 LAB
A ALTERNATA IGE QN: 0.59 KU/L
A FUMIGATUS IGE QN: <0.1 KU/L
BERMUDA GRASS IGE QN: 0.4 KU/L
BOXELDER IGE QN: 0.14 KU/L
C HERBARUM IGE QN: <0.1 KU/L
CALIF WALNUT POLN IGE QN: <0.1 KU/L
CAT DANDER IGE QN: 0.43 KU/L
CMN PIGWEED IGE QN: 0.12 KU/L
COMMON RAGWEED IGE QN: 0.56 KU/L
COTTONWOOD IGE QN: <0.1 KU/L
D FARINAE IGE QN: <0.1 KU/L
D PTERONYSS IGE QN: <0.1 KU/L
DOG DANDER IGE QN: 0.27 KU/L
ENGL PLANTAIN IGE QN: <0.1 KU/L
GOOSEFOOT IGE QN: <0.1 KU/L
JOHNSON GRASS IGE QN: 0.46 KU/L
KENT BLUE GRASS IGE QN: 1.61 KU/L
LONDON PLANE IGE QN: 0.13 KU/L
MT JUNIPER IGE QN: 0.2 KU/L
P NOTATUM IGE QN: <0.1 KU/L
PECAN/HICK TREE IGE QN: <0.1 KU/L
ROACH IGE QN: <0.1 KU/L
SALTWORT IGE QN: <0.1 KU/L
SHEEP SORREL IGE QN: <0.1 KU/L
SILVER BIRCH IGE QN: <0.1 KU/L
TIMOTHY IGE QN: 1.09 KU/L
TOTAL IGE SMQN RAST: 51.4 KU/L
WHITE ASH IGE QN: <0.1 KU/L
WHITE ELM IGE QN: 0.14 KU/L
WHITE MULBERRY IGE QN: <0.1 KU/L
WHITE OAK IGE QN: <0.1 KU/L

## 2025-04-04 ENCOUNTER — HOSPITAL ENCOUNTER (OUTPATIENT)
Dept: RADIOLOGY | Facility: CLINIC | Age: 75
Discharge: HOME | End: 2025-04-04
Payer: MEDICARE

## 2025-04-04 DIAGNOSIS — Z12.31 SCREENING MAMMOGRAM FOR BREAST CANCER: ICD-10-CM

## 2025-04-04 PROCEDURE — 77063 BREAST TOMOSYNTHESIS BI: CPT

## 2025-07-15 ENCOUNTER — APPOINTMENT (OUTPATIENT)
Dept: OBSTETRICS AND GYNECOLOGY | Facility: CLINIC | Age: 75
End: 2025-07-15
Payer: MEDICARE

## 2025-07-15 VITALS
WEIGHT: 129 LBS | HEIGHT: 64 IN | BODY MASS INDEX: 22.02 KG/M2 | DIASTOLIC BLOOD PRESSURE: 64 MMHG | SYSTOLIC BLOOD PRESSURE: 106 MMHG

## 2025-07-15 DIAGNOSIS — N32.81 OAB (OVERACTIVE BLADDER): ICD-10-CM

## 2025-07-15 DIAGNOSIS — N39.490 OVERFLOW INCONTINENCE: ICD-10-CM

## 2025-07-15 DIAGNOSIS — N81.10 POP-Q STAGE 3 CYSTOCELE: Primary | ICD-10-CM

## 2025-07-15 DIAGNOSIS — R15.9 INCONTINENCE OF FECES, UNSPECIFIED FECAL INCONTINENCE TYPE: ICD-10-CM

## 2025-07-15 LAB
POC APPEARANCE, URINE: CLEAR
POC BILIRUBIN, URINE: NEGATIVE
POC BLOOD, URINE: NEGATIVE
POC COLOR, URINE: YELLOW
POC GLUCOSE, URINE: NEGATIVE MG/DL
POC KETONES, URINE: NEGATIVE MG/DL
POC LEUKOCYTES, URINE: NEGATIVE
POC NITRITE,URINE: NEGATIVE
POC PH, URINE: 7 PH
POC PROTEIN, URINE: NEGATIVE MG/DL
POC SPECIFIC GRAVITY, URINE: <=1.005
POC UROBILINOGEN, URINE: 0.2 EU/DL

## 2025-07-15 PROCEDURE — G2211 COMPLEX E/M VISIT ADD ON: HCPCS | Performed by: OBSTETRICS & GYNECOLOGY

## 2025-07-15 PROCEDURE — 51798 US URINE CAPACITY MEASURE: CPT | Performed by: OBSTETRICS & GYNECOLOGY

## 2025-07-15 PROCEDURE — 99204 OFFICE O/P NEW MOD 45 MIN: CPT | Performed by: OBSTETRICS & GYNECOLOGY

## 2025-07-15 PROCEDURE — 81003 URINALYSIS AUTO W/O SCOPE: CPT | Performed by: OBSTETRICS & GYNECOLOGY

## 2025-07-15 PROCEDURE — 1159F MED LIST DOCD IN RCRD: CPT | Performed by: OBSTETRICS & GYNECOLOGY

## 2025-07-15 PROCEDURE — 3008F BODY MASS INDEX DOCD: CPT | Performed by: OBSTETRICS & GYNECOLOGY

## 2025-07-15 ASSESSMENT — ENCOUNTER SYMPTOMS
CARDIOVASCULAR NEGATIVE: 1
CONSTITUTIONAL NEGATIVE: 1
NEUROLOGICAL NEGATIVE: 1
EYES NEGATIVE: 1
RESPIRATORY NEGATIVE: 1
GASTROINTESTINAL NEGATIVE: 1
PSYCHIATRIC NEGATIVE: 1
ENDOCRINE NEGATIVE: 1
MUSCULOSKELETAL NEGATIVE: 1

## 2025-07-15 NOTE — PATIENT INSTRUCTIONS
Fiber Therapy:    Fiber acts in the colon (large bowel) to make the stool soft.  If the stool is too hard, the fiber draws water in and makes it softer.  If the stool is too watery, it acts like a 'sponge' and soaks up the liquid.     Many foods contain fiber. Fruits, vegetables, whole grains, and high fiber cereals all contain some fiber. Unfortunately, most of us don’t eat enough and a fiber supplement is a good way to increase soluble fiber intake.     Supplemental fiber comes in many forms. You could choose from:    1.   Powder  2.   Tablets  3.   Wafers/Cookies/Gummies    Some common brands include:    1.   Benefiber (tasteless powder)  2.   Metamucil (powder)  3.   Konsyl (powder)  4.   Citrucel (powder, may cause less gas)  5.   Fiber-Con (tablet)    All of these products work in the same way, but some may work better than others for you.    Dosin.   One tablespoon of powder dissolves in 8-16 oz of water or juice OR  2.   Two tablets with 8-16 oz water or juice OR  3.   Two fiber wafers/cookies with 8-16 oz of water or juice    Take fiber in the morning. Start off using it once per day. You can then increase frequency if needed.    IF FIBER IS NOT TAKEN WITH ADEQUATE WATER/FLUID INTAKE, IT MAY BE CONSTIPATING.  DRINK 6-8 GLASSES OF WATER/LIQUIDS THROUGHOUT THE DAY WHILE TAKING FIBER SUPPLEMENTATION.    Fiber can cause gas/bloating, especially when first starting the treatment.  If this is the case, you can take simethicone (Gas-X) over the counter after meals and at bedtime as needed.    Summary:  1.  Remember: the most common cause of constipation is inadequate water intake during the day. Avoiding dehydration is usually the key to success with fiber supplementation.  2. Using fiber requires some experimentation- if one brand doesn’t work or causes excessive gas, take another. Also, try varying the form of fiber- for example, if the powder is unpalatable; take the tablets or wafers instead.  3. You  may need more than one dose per day- feel free to increase your dose to morning and mid-day if that works better.  4. Results depend on consistency of usage- the more consistent you are in taking fiber, the better the results!      We discussed lifestyle changes includin) Aiming to drink around 60 oz total of fluids per day   2) Avoiding bladder irritants such as caffeine, nicotine, artificial sweeteners   3) Stop drinking fluids 3 hours before bedtime   4) Timed voids every 2-3 hours.        Pessary     A pessary is a round device inserted into the vagina to support fallen or prolapsed uterus, bladder or rectum. ?It may ease your feelings of vaginal pressure or bulging. It can also be used to alleviate urinary incontinence by repositioning the problem area back to its original position. It can be a relatively simple solution to an uncomfortable problem.     There are many different types of pessaries and each is made in many sizes. ?We have worked with you today to find the correct pessary for you. ?It is not uncommon that fitting will take a few tries.      What is it made of?     Pessaries are made of soft, pliable medical grade silicon. This ensures a longer usage life and less chance of an allergic reaction.      How is it inserted?     A pessary is inserted with a small amount of lubricant much like a diaphragm. ?A properly fitted pessary should not be felt once it is place.        How do I take care of it?      You can be taught how to remove your pessary if you wish and are physically able to do so. ?If not, you can come to the clinic every three months and we will remove it for you, clean it and reinsert it.     A pessary can be removed as often as you wish or at least once every two weeks. ?Most women will notice increased vaginal discharge when leaving it in the vagina for a prolonged period of time. ?This is a normal response and unless the discharge becomes irritating or foul smelling, it is not a  sign of infection. ?You may also notice the pessary will change color over time and this also is normal.      Tell your physician if you are sexually active and your provider will fit you with the appropriate type if you choose to leave the pessary in the vagina during intercourse. ?     At home, when you remove your pessary, it should be washed with mild soap and water and stored to be kept clean until you reinsert it. ?Keep pessary out overnight and replace in the morning.??The vagina is not sterile, so soap and water are appropriate for cleaning. ?Alcohol, bleach, peroxide, or other harsh chemicals should not be used as they can irritate the vagina and cause changes in the pessary rubber. ?     ?  Will it fall out?     The pessary may fall out with straining or heavy lifting. ?If this happens, notify your physician. ?It may be that your pessary is too small or you need have a change in size if it has been some time. You can also support the pessary vaginally during a bowel movement to prevent it from coming out. If your pessary falls out, wash it with soap and water before reinserting. ?       Patient follow up:     Report inability to urinate or have bowel movement.   Report any pain in urination or bowel movements.   Report any signs of vaginal bleeding.   Report and discharge with strong odor.   Report any discomfort or pain while pessary is in place

## 2025-07-15 NOTE — PROGRESS NOTES
[facility] Department of Urogynecology   Alem Marmolejo MD, MPH   [phone]    ASSESSMENT AND PLAN:   74 year old female with OAB, FI, and a stage 3 anterior wall predominant uterovaginal prolapse.    Diagnoses:  #1 Overactive bladder  #2 Fecal incontinence   #3 Cystocele with uterine descensus, stage 3    Plan:  1. OAB, UUI, ddx: overflow incontinence   - PVR = 19mL by bladder U/S and POCT UA negative.   - Reviewed sensation of incomplete bladder emptying may be related to her POP and reassured her she empties her bladder normally based on her PVR today.   - Plan to reassess if her OAB symptoms improve after a pessary fitting as her UUI may be more considered overflow incontinence secondary to her POP.     2. FI, diarrhea  - We discussed that there are two primary factors that attribute to fecal incontinence: stool consistency and anal sphincter muscle control.   - Counseled to optimize the consistency of her stool by taking a daily fiber supplement (i.e. Benefiber or Metamucil) which makes the stools formed and easier to control. She may consider going to PFPT to optimize strengthening the anal sphincter muscle tone to learn how to better control stools to prevent FI episodes from occurring. However, if she fails fiber and PFPT we may further discuss utility of placing InterStim SNM.   - Reviewed the goal of 25 grams of dietary fiber per day and to have a soft/easy to pass BM at least 3x/week without pushing or straining.   - Patient was encouraged her to continue daily fiber as this has helped bulk her stools and may take Imodium PRN when leaving the house to prevent diarrhea.     3. Cystocele with uterine descensus, stage 3  - Upon exam the POP-Q demonstrated Aa: +1.5, Ba: +1.5, C: -5, gh: 2.5, Ap: -1.5, Bp: -1.5, and D: -7 consistent with a stage 3 cystocele with some uterine descensus.   - Reviewed risk factors, natural history and etiology of prolapse.   - Discussed POP management options for  prolapse including expectant management, PFPT, pessary fitting, and surgery.   - We discussed that PFPT would help strengthen the PF muscles with an overall goal to help improve the symptoms of the vaginal bulge and prevent POP from worsening over time.   - We discussed the benefits of fitting her with a pessary which is a small flexible silicone ring that is placed intravaginally to help support the pelvic organs to prevent the symptomatic vaginal bulge but the pessary does not cure POP. She may learn how to manage the pessary at home on her own with taking the pessary in/out prior to intercourse and recommended maintenance at least every 2 weeks or she may RTC every 3 months for us to perform her pessary maintenance.   - We discussed there are different POP repair surgical approaches to consider given that she wishes to maintain the ability to be sexually active in the future such as laparoscopic abdominal surgery that involves placing polypropylene mesh (i.e. SCP + JOSE ENRIQUE) which has a success rate around 90% or a native tissue repair vaginal approach that does not involve mesh but rather uses sutures with the intent of the body to scar over to suspend the pelvic organs that can be performed with or without a hysterectomy (i.e. SSLS +/- TVH and APR) which has a success rate around 75%. For either surgery, it involves 6 weeks of postoperative pelvic rest and no heavy lifting > 5-10 pounds. However, if she does not have any preference in the surgical approach to her POP repair surgery we discussed consideration of enrolling in the PREMIER trial which randomizes her to either the SCP or the SSLS and the goal of this study is to assess the head-to-head long term success rates of these surgical procedures.   > She is interested in a pessary fitting while considering a POP repair surgery after she runs in the marathon she is training for.   > We gave her PI handouts on the SCP and USLS to review.  Candidate for  PREMIER    Follow up in 2-4 weeks with Dr. Aelm Marmolejo @ [facility] for a pessary fitting.     Scribe Attestation  By signing my name below, I, Casa To, Luis E, attest that this documentation has been prepared under the direction and in the presence of Alem Marmolejo MD MPH on 07/15/2025 at 3:53 PM.     Problem List Items Addressed This Visit    None  Visit Diagnoses         Incontinence of feces, unspecified fecal incontinence type    -  Primary      OAB (overactive bladder)        Relevant Orders    POCT UA Automated manually resulted (Completed)    Post-Void Residual (Completed)      POP-Q stage 3 cystocele          Overflow incontinence        Relevant Orders    Post-Void Residual (Completed)           I spent a total of 45 minutes in face to face and non face to face time.   I Dr. Marmolejo, personally performed the services described in the documentation as scribed in my presence and confirm it is both complete and accurate.  Alem Marmolejo MD, MPH, FACOG       Alem Marmolejo MD, MPH, FACOG     New    HISTORY OF PRESENT ILLNESS:   74 year old female presenting as a new patient for evaluation and management of urinary incontinence.     Prolapse Symptoms:  - She reports a sensation of a vaginal bulge that became symptomatic within the past year.      Urinary Symptoms:   - The patient reports experiencing urinary incontinence for the past several years but recently noticed her UI worsening.   - She reports training for a marathon and has recently been running more for this. The patient is unable to tell if she is leaking urine with ROBBY during running with impact of her feet on the pavement or is she is leaking with more urgency during running. However, she thinks she is leaking more with urgency while running.   - Voids 6x per day.   - Nocturia 2x/night and this is disruptive to her sleep as she has difficulty falling back asleep.   - Denies any ROBBY with coughing, laughing, or sneezing.   - Endorses UUI  on her way to the bathroom during the day.   - Patient endorses sensation of incomplete bladder emptying sometimes described as having to shift positions on the toilet to empty her bladder completely.   - She wears pads daily for UUI but denies any vulvar itching or irritation related to this.     Bowel Symptoms:   - She has a BM 2x/day.   - Occasional diarrhea and attributes this to dietary changes; she has diarrhea for 1-2 days but resolves - she has had diarrhea for a consecutive month in the past.   - Patient endorses FI with diarrhea and fecal urgency. However, she is able to control formed stools.   - Previously had a colonoscopy within the past 10 years and is UTD on this.     OBGYN History and Sexual Activity:   - , x 3   - Weight of largest baby: 8#10oz  - Denies any prior history of third or fourth degree OASI.   - Postmenopausal  - Prior abdominopelvic surgeries include unilateral salpingo-oophorectomy due to ovarian cyst.   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   - Remote history >30 years ago of abnormal Pap smear that required colposcopy with ECC.        Past Medical History:     Medical History[1]       Past Surgical History:     Surgical History[2]      Medications:     Prior to Admission medications    Medication Sig Start Date End Date Taking? Authorizing Provider   albuterol 90 mcg/actuation inhaler Inhale 2 puffs every 6 hours if needed for wheezing. 24  Urbano Butcher MD MPH   atorvastatin (Lipitor) 20 mg tablet Take 1 tablet (20 mg) by mouth once daily at bedtime.    Historical Provider, MD   benzonatate (Tessalon) 100 mg capsule TAKE 1 CAPSULE (100 MG) BY MOUTH 3 TIMES A DAY AS NEEDED FOR COUGH. DO NOT CRUSH OR CHEW. 25  Urbano Butcher MD MPH   budesonide (Pulmicort) 0.5 mg/2 mL nebulizer solution Take 2 mL (0.5  "mg) by nebulization 2 times a day. Rinse mouth with water after use to reduce aftertaste and incidence of candidiasis. Do not swallow. 11/25/24 11/25/25  Urbano Butcher MD MPH   mometasone-formoterol (Dulera) 100-5 mcg/actuation inhaler Inhale 2 puffs 2 times a day. Rinse mouth with water after use to reduce aftertaste and incidence of candidiasis. Do not swallow. 6/12/24 6/12/25  Urbano Butcher MD MPH   pantoprazole (ProtoNix) 40 mg EC tablet TAKE 1 TABLET BY MOUTH EVERY DAY IN THE MORNING 11/3/23   Isauro Michelle MD        ROS  Review of Systems   Constitutional: Negative.    HENT: Negative.     Eyes: Negative.    Respiratory: Negative.     Cardiovascular: Negative.    Gastrointestinal: Negative.    Endocrine: Negative.    Genitourinary:  Positive for enuresis.   Musculoskeletal: Negative.    Neurological: Negative.    Psychiatric/Behavioral: Negative.            PHYSICAL EXAM:    /64   Ht 1.626 m (5' 4\")   Wt 58.5 kg (129 lb)   BMI 22.14 kg/m²   No LMP recorded. Patient is postmenopausal.    Declines chaperone for physical exam.      Well developed, well nourished, in no apparent distress.   Neurologic/Psychiatric:  Awake, Alert and Oriented times 3.  Affect normal.     GENITAL/URINARY:     External Genitalia:  The patient has normal appearing external genitalia, normal skenes and bartholins glands, and a normal hair distribution.  Her vulva is without lesions, erythema or discharge.  It is non-tender with appropriate sensation. There is bilateral erythema over the labia majora, contact dermatitis related to wearing pads.     Urethral Meatus:  Size normal, Location normal, Lesions absent, Prolapse absent.    Urethra:  Fullness absent, Masses absent. Negative CST in the supine position.     Bladder:  Fullness absent, Masses absent, Tenderness absent.    Vagina:  General appearance normal, Estrogen effect normal, Discharge absent, Lesions absent.     Cervix: Normal, no discharge.   Uterus:  " "normal size, mobile, and nontender  Adnexa:  normal, no masses or tenderness bilaterally    Anus/Perineum:  Lesions absent and masses absent. Normal appearing anus, visually concentric anal squeeze. Perineum is intact.     Stress urinary incontinence not demonstrable.       Physical Exam  Genitourinary:      Vulva, bladder and urethral meatus normal.      No lesions in the vagina.      Right Labia: No lesions.     Left Labia: No lesions.     No vaginal tenderness or bleeding.      Anterior vaginal prolapse present.     Mild vaginal atrophy present.       Right Adnexa: not tender and no mass present.     Left Adnexa: not tender and no mass present.     No cervical motion tenderness.      Uterus is not enlarged, fixed or tender.      No uterine mass detected.     No urethral tenderness, mass or stress urinary incontinence with cough stress test present.   POP-Q measurements were:      Aa: +1.5, Ba: +1.5, C: -5     gH: 2.5, pB: TVL:      Ap: -1.5, Bp: -1.5, D: -7     Pelvic exam was performed with patient in the lithotomy position.   Rectum:      No external hemorrhoid.         She does not have myofascial tenderness on exam.   Her highest pain score on exam is 1        Rectal exam: Deferred.       Data and DIAGNOSTIC STUDIES REVIEWED   Imaging  No results found.    Cardiology, Vascular, and Other Imaging  No other imaging results found for the past 7 days     No results found for: \"URINECULTURE\", \"UAMICCOMM\"   Lab Results   Component Value Date    GLUCOSE 85 03/13/2024    CALCIUM 9.6 03/13/2024     03/13/2024    K 4.4 03/13/2024    CO2 27 03/13/2024     03/13/2024    BUN 26 (H) 03/13/2024    CREATININE 1.03 03/13/2024     Lab Results   Component Value Date    WBC 3.5 (L) 11/27/2024    HGB 12.1 11/27/2024    HCT 38.0 11/27/2024    MCV 95 11/27/2024     11/27/2024          [1]   Past Medical History:  Diagnosis Date    Other specific arthropathies, not elsewhere classified, unspecified shoulder     " Rotator cuff arthropathy    Personal history of other diseases of the circulatory system     History of Raynaud's syndrome    Personal history of other diseases of the musculoskeletal system and connective tissue 11/10/2014    History of rotator cuff tear    Personal history of other diseases of the musculoskeletal system and connective tissue     History of arthritis    Personal history of other diseases of the respiratory system     History of allergic rhinitis   [2]   Past Surgical History:  Procedure Laterality Date    OOPHORECTOMY  2008?    OTHER SURGICAL HISTORY  01/28/2014    Salpingo-oophorectomy Unilateral

## 2025-08-01 ENCOUNTER — OFFICE VISIT (OUTPATIENT)
Dept: OBSTETRICS AND GYNECOLOGY | Facility: CLINIC | Age: 75
End: 2025-08-01
Payer: MEDICARE

## 2025-08-01 VITALS
DIASTOLIC BLOOD PRESSURE: 55 MMHG | SYSTOLIC BLOOD PRESSURE: 101 MMHG | HEART RATE: 65 BPM | BODY MASS INDEX: 21.72 KG/M2 | WEIGHT: 127.2 LBS | HEIGHT: 64 IN

## 2025-08-01 DIAGNOSIS — N81.4 UTEROVAGINAL PROLAPSE: ICD-10-CM

## 2025-08-01 DIAGNOSIS — Z46.89 ENCOUNTER FOR FITTING AND ADJUSTMENT OF PESSARY: Primary | ICD-10-CM

## 2025-08-01 PROCEDURE — 1159F MED LIST DOCD IN RCRD: CPT | Performed by: OBSTETRICS & GYNECOLOGY

## 2025-08-01 PROCEDURE — 1126F AMNT PAIN NOTED NONE PRSNT: CPT | Performed by: OBSTETRICS & GYNECOLOGY

## 2025-08-01 PROCEDURE — 57160 INSERT PESSARY/OTHER DEVICE: CPT | Performed by: OBSTETRICS & GYNECOLOGY

## 2025-08-01 PROCEDURE — 99214 OFFICE O/P EST MOD 30 MIN: CPT | Mod: 25 | Performed by: OBSTETRICS & GYNECOLOGY

## 2025-08-01 PROCEDURE — 1036F TOBACCO NON-USER: CPT | Performed by: OBSTETRICS & GYNECOLOGY

## 2025-08-01 PROCEDURE — A4561 PESSARY RUBBER, ANY TYPE: HCPCS | Performed by: OBSTETRICS & GYNECOLOGY

## 2025-08-01 PROCEDURE — 3008F BODY MASS INDEX DOCD: CPT | Performed by: OBSTETRICS & GYNECOLOGY

## 2025-08-01 PROCEDURE — 99214 OFFICE O/P EST MOD 30 MIN: CPT | Performed by: OBSTETRICS & GYNECOLOGY

## 2025-08-01 ASSESSMENT — PAIN SCALES - GENERAL: PAINLEVEL_OUTOF10: 0-NO PAIN

## 2025-08-01 ASSESSMENT — ENCOUNTER SYMPTOMS
OCCASIONAL FEELINGS OF UNSTEADINESS: 0
DEPRESSION: 0
LOSS OF SENSATION IN FEET: 0

## 2025-08-01 NOTE — PROGRESS NOTES
[facility] Department of Urogynecology   Alem Marmolejo MD, MPH   [phone]    ASSESSMENT AND PLAN:   74 y.o. female with OAB, FI, and a stage 3 anterior wall predominant uterovaginal prolapse.     Diagnoses:   #1 Stage 3 anterior wall predominant uterovaginal prolapse    Plan:   1. Uterovaginal prolapse   - Fit with #2 ring with support.   - She may learn how to manage the pessary at home on her own with taking the pessary in/out prior to intercourse or she may RTC every 3 months for us to perform her pessary maintenance. The longer she goes in between pessary checks, she can expect an increase in vaginal discharge. To prevent erosions, remove the pessary at least q3-4 months.   - If the current pessary is uncomfortable, we will adjust the sizing at next visit potentially going down to #1 ring with support.   - She was taught how to remove and replace the pessary today.      Follow-up in 1-2 weeks with Dr. Marmolejo or Massiel Muñoz NP.     Scribe Attestation:   I, Roxann Bonilla, am scribing for virtually, and in the presence of Alem Marmolejo MD MPH on 08/01/2025 at 12:58 PM.     Problem List Items Addressed This Visit    None  Visit Diagnoses         Encounter for fitting and adjustment of pessary    -  Primary      Uterovaginal prolapse               I spent a total of 30 minutes in face to face and non face to face time.   I Dr. Marmolejo, personally performed the services described in the documentation as scribed in my presence and confirm it is both complete and accurate.  Alem Marmolejo MD, MPH, FACOG       Alem Marmolejo MD, MPH, FACOG     Established    HISTORY OF PRESENT ILLNESS:     Jeanna Hassan is a 74 y.o. female who presents for pessary fitting.     Record Review:   - 7/15/25 Dr. Marmolejo note: Reassess OAB after pessary fitting. Start fiber and take Imodium PRN for diarrhea. Plan for pessary fitting for POP.     Prolapse Symptoms:  - She desires a pessary fitting.        Past Medical  "History:     Medical History[1]       Past Surgical History:     - laparoscopic unilateral oophorectomy     Surgical History[2]      Medications:     Prior to Admission medications   Medication Sig Start Date End Date Taking? Authorizing Provider   atorvastatin (Lipitor) 20 mg tablet Take 1 tablet (20 mg) by mouth once daily at bedtime.   Yes Historical Provider, MD   benzonatate (Tessalon) 100 mg capsule TAKE 1 CAPSULE (100 MG) BY MOUTH 3 TIMES A DAY AS NEEDED FOR COUGH. DO NOT CRUSH OR CHEW. 4/9/25 4/9/26 Yes Urbano Butcher MD MPH   budesonide (Pulmicort) 0.5 mg/2 mL nebulizer solution Take 2 mL (0.5 mg) by nebulization 2 times a day. Rinse mouth with water after use to reduce aftertaste and incidence of candidiasis. Do not swallow. 11/25/24 11/25/25 Yes Urbano Butcher MD MPH   albuterol 90 mcg/actuation inhaler Inhale 2 puffs every 6 hours if needed for wheezing. 6/12/24 6/12/25  Urbano Butcher MD MPH   mometasone-formoterol (Dulera) 100-5 mcg/actuation inhaler Inhale 2 puffs 2 times a day. Rinse mouth with water after use to reduce aftertaste and incidence of candidiasis. Do not swallow. 6/12/24 6/12/25  Urbano Butcher MD MPH   pantoprazole (ProtoNix) 40 mg EC tablet TAKE 1 TABLET BY MOUTH EVERY DAY IN THE MORNING 11/3/23   Isauro Michelle MD        ROS  Review of Systems       PHYSICAL EXAM:    /55 (Patient Position: Sitting)   Pulse 65   Ht 1.626 m (5' 4\")   Wt 57.7 kg (127 lb 3.2 oz)   BMI 21.83 kg/m²   No LMP recorded. Patient is postmenopausal.    Declines chaperone for physical exam.      Well developed, well nourished, in no apparent distress.   Neurologic/Psychiatric:  Awake, Alert and Oriented times 3.  Affect normal.     OBGyn Exam    Patient ID: Jeanna Hassan is a 74 y.o. female.    Pessary    Date/Time: 8/1/2025 10:30 AM    Performed by: Alem Marmolejo MD MPH  Authorized by: Alem Marmolejo MD MPH    Consent:     Procedural risks discussed: yes      Patient agrees, " "verbalizes understanding, and wants to proceed: yes      Consent given by:  Patient  Indication:     Indication for pessary: cystocele    Procedure:     Pessary type:  Ring w/ support    Pessary size:  2  Outcomes:     Patient tolerance of procedure:  Tolerated well, no immediate complications  Comments:     Procedure comments:  The pessary did sit low in the vagina, but pt felt comfortable with it in place.       Data and DIAGNOSTIC STUDIES REVIEWED   Imaging  No results found.    Cardiology, Vascular, and Other Imaging  No other imaging results found for the past 7 days     No results found for: \"URINECULTURE\", \"UAMICCOMM\"   Lab Results   Component Value Date    GLUCOSE 85 03/13/2024    CALCIUM 9.6 03/13/2024     03/13/2024    K 4.4 03/13/2024    CO2 27 03/13/2024     03/13/2024    BUN 26 (H) 03/13/2024    CREATININE 1.03 03/13/2024     Lab Results   Component Value Date    WBC 3.5 (L) 11/27/2024    HGB 12.1 11/27/2024    HCT 38.0 11/27/2024    MCV 95 11/27/2024     11/27/2024             [1]   Past Medical History:  Diagnosis Date    Other specific arthropathies, not elsewhere classified, unspecified shoulder     Rotator cuff arthropathy    Personal history of other diseases of the circulatory system     History of Raynaud's syndrome    Personal history of other diseases of the musculoskeletal system and connective tissue 11/10/2014    History of rotator cuff tear    Personal history of other diseases of the musculoskeletal system and connective tissue     History of arthritis    Personal history of other diseases of the respiratory system     History of allergic rhinitis   [2]   Past Surgical History:  Procedure Laterality Date    OOPHORECTOMY  2008?    OTHER SURGICAL HISTORY  01/28/2014    Salpingo-oophorectomy Unilateral     "

## 2025-08-13 ENCOUNTER — APPOINTMENT (OUTPATIENT)
Dept: OBSTETRICS AND GYNECOLOGY | Facility: CLINIC | Age: 75
End: 2025-08-13
Payer: MEDICARE

## 2025-08-15 ENCOUNTER — APPOINTMENT (OUTPATIENT)
Dept: OBSTETRICS AND GYNECOLOGY | Facility: CLINIC | Age: 75
End: 2025-08-15
Payer: MEDICARE